# Patient Record
Sex: MALE | Race: WHITE | NOT HISPANIC OR LATINO
[De-identification: names, ages, dates, MRNs, and addresses within clinical notes are randomized per-mention and may not be internally consistent; named-entity substitution may affect disease eponyms.]

---

## 2021-07-20 PROBLEM — Z00.00 ENCOUNTER FOR PREVENTIVE HEALTH EXAMINATION: Status: ACTIVE | Noted: 2021-07-20

## 2021-07-22 ENCOUNTER — APPOINTMENT (OUTPATIENT)
Dept: OTOLARYNGOLOGY | Facility: CLINIC | Age: 36
End: 2021-07-22
Payer: COMMERCIAL

## 2021-07-22 VITALS
HEART RATE: 55 BPM | OXYGEN SATURATION: 99 % | DIASTOLIC BLOOD PRESSURE: 78 MMHG | BODY MASS INDEX: 23.3 KG/M2 | WEIGHT: 145 LBS | SYSTOLIC BLOOD PRESSURE: 136 MMHG | TEMPERATURE: 97.6 F | HEIGHT: 66 IN

## 2021-07-22 DIAGNOSIS — Z78.9 OTHER SPECIFIED HEALTH STATUS: ICD-10-CM

## 2021-07-22 DIAGNOSIS — R42 DIZZINESS AND GIDDINESS: ICD-10-CM

## 2021-07-22 DIAGNOSIS — F17.200 NICOTINE DEPENDENCE, UNSPECIFIED, UNCOMPLICATED: ICD-10-CM

## 2021-07-22 PROCEDURE — 92550 TYMPANOMETRY & REFLEX THRESH: CPT

## 2021-07-22 PROCEDURE — 99203 OFFICE O/P NEW LOW 30 MIN: CPT

## 2021-07-22 PROCEDURE — 99072 ADDL SUPL MATRL&STAF TM PHE: CPT

## 2021-07-22 PROCEDURE — 92557 COMPREHENSIVE HEARING TEST: CPT

## 2021-07-22 PROCEDURE — 92700 UNLISTED ORL SERVICE/PX: CPT

## 2021-07-22 RX ORDER — CLONAZEPAM 2 MG/1
TABLET ORAL
Refills: 0 | Status: ACTIVE | COMMUNITY

## 2021-07-22 RX ORDER — ESOMEPRAZOLE MAGNESIUM 10 MG/1
10 GRANULE, DELAYED RELEASE ORAL
Refills: 0 | Status: ACTIVE | COMMUNITY

## 2021-07-22 RX ORDER — MECLIZINE HYDROCHLORIDE 25 MG/1
25 TABLET, CHEWABLE ORAL
Refills: 0 | Status: ACTIVE | COMMUNITY

## 2021-07-22 NOTE — HISTORY OF PRESENT ILLNESS
[de-identified] : 36M who presents with vertigo x 4 weeks. Patient reports about 4-5 weeks ago he had diarrhea and nausea followed by room spinning dizziness which started about a week later. He reports the dizziness lasts all day with associated nausea. He also notes a difference in hearing when using the phone with decreased hearing on the right compared to the left. He denies any tinnitus, otalgia, otorrhea. 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 \par \par No other ENT complaints. No pertinent FH/SH. he took bonine today which helps him.

## 2021-07-22 NOTE — REVIEW OF SYSTEMS
[Patient Intake Form Reviewed] : Patient intake form was reviewed [As Noted in HPI] : as noted in HPI [Vomiting] : vomiting [Diarrhea] : diarrhea [Negative] : Heme/Lymph [FreeTextEntry1] : all other ROS negative

## 2021-07-22 NOTE — PHYSICAL EXAM
[Midline] : trachea located in midline position [Normal] : orientation to person, place, and time: normal [] : [place]-Hallpike test is negative [de-identified] : gait steady

## 2021-07-22 NOTE — ASSESSMENT
[FreeTextEntry1] : 36M who presents with dizziness x 4 weeks. Fistula test negative.took bonine today so cannot do vng\par \par Plan:\par - audio/tymp\par - VNG\par - VEMP\par - MRI IAC\par rtc with above to try to find the cause of the vertigo. also recommended to see his internist to r/o systemic cause - he said he is undergoing workup. he said he has ct ordered for next week- advised to let ordering physician know we are ordering mri

## 2021-07-30 ENCOUNTER — APPOINTMENT (OUTPATIENT)
Dept: OTOLARYNGOLOGY | Facility: CLINIC | Age: 36
End: 2021-07-30
Payer: COMMERCIAL

## 2021-07-30 VITALS
HEART RATE: 51 BPM | OXYGEN SATURATION: 100 % | TEMPERATURE: 98.5 F | SYSTOLIC BLOOD PRESSURE: 139 MMHG | DIASTOLIC BLOOD PRESSURE: 85 MMHG

## 2021-07-30 DIAGNOSIS — R42 DIZZINESS AND GIDDINESS: ICD-10-CM

## 2021-07-30 PROCEDURE — 92517 VEMP TEST I&R CERVICAL: CPT

## 2021-07-30 PROCEDURE — 92540 BASIC VESTIBULAR EVALUATION: CPT

## 2021-07-30 PROCEDURE — 92537 CALORIC VSTBLR TEST W/REC: CPT

## 2021-07-30 PROCEDURE — 99213 OFFICE O/P EST LOW 20 MIN: CPT

## 2021-07-30 NOTE — DATA REVIEWED
[de-identified] : mri reviewed images with pt and Dr Murdock - mild ds, mild sins opacification, brain normal, no evidence of fx

## 2021-07-30 NOTE — HISTORY OF PRESENT ILLNESS
[de-identified] : followup 37 yo man with 4 weeks of dizziness after landing on his face doing gymnastics. He had mri and is here to review. has more vestibular testing this pm. He had negative fistula test and normal hearing last week. he feels slightly better but the disequilibrium persists.

## 2021-07-30 NOTE — ASSESSMENT
[FreeTextEntry1] : reassured mri ok\par likely vn or concussion\par await vng and vemp\par will discuss with him when they are done

## 2021-08-05 ENCOUNTER — APPOINTMENT (OUTPATIENT)
Dept: OTOLARYNGOLOGY | Facility: CLINIC | Age: 36
End: 2021-08-05
Payer: COMMERCIAL

## 2021-08-05 VITALS
SYSTOLIC BLOOD PRESSURE: 123 MMHG | TEMPERATURE: 98.6 F | DIASTOLIC BLOOD PRESSURE: 72 MMHG | OXYGEN SATURATION: 97 % | HEART RATE: 72 BPM

## 2021-08-05 DIAGNOSIS — R42 DIZZINESS AND GIDDINESS: ICD-10-CM

## 2021-08-05 DIAGNOSIS — R09.81 NASAL CONGESTION: ICD-10-CM

## 2021-08-05 PROCEDURE — 99214 OFFICE O/P EST MOD 30 MIN: CPT

## 2021-08-05 NOTE — ASSESSMENT
[FreeTextEntry1] : concussion/postconcussive vertigo likely etiology with vertical nystagmus\par v rehab\par reassured about ct\par referred to neurology lh (not er) as this is not acute\par followup 2 mo\par nasal congestion is mild; prefers to leave alone

## 2021-08-05 NOTE — DATA REVIEWED
[de-identified] : vng vertical nystagmus reviewed with pt [de-identified] : ct ,mild r max sinus polypoid congestion images and report reviewed with pt

## 2021-08-05 NOTE — HISTORY OF PRESENT ILLNESS
[de-identified] : followup 35 yo man who landed on his face with handsprings and has felt dizzy since. He rerts he is slightly better, but it is still present. Denies hearing changes. Had ct and vng and is here to review results.

## 2021-08-06 ENCOUNTER — APPOINTMENT (OUTPATIENT)
Dept: NEUROLOGY | Facility: CLINIC | Age: 36
End: 2021-08-06
Payer: COMMERCIAL

## 2021-08-06 VITALS
OXYGEN SATURATION: 97 % | TEMPERATURE: 98.1 F | DIASTOLIC BLOOD PRESSURE: 74 MMHG | WEIGHT: 147 LBS | HEIGHT: 66 IN | BODY MASS INDEX: 23.63 KG/M2 | HEART RATE: 76 BPM | SYSTOLIC BLOOD PRESSURE: 117 MMHG

## 2021-08-06 DIAGNOSIS — I77.74 DISSECTION OF VERTEBRAL ARTERY: ICD-10-CM

## 2021-08-06 DIAGNOSIS — F07.81 POSTCONCUSSIONAL SYNDROME: ICD-10-CM

## 2021-08-06 PROCEDURE — 99204 OFFICE O/P NEW MOD 45 MIN: CPT

## 2021-08-06 RX ORDER — ASPIRIN ENTERIC COATED TABLETS 81 MG 81 MG/1
81 TABLET, DELAYED RELEASE ORAL
Qty: 30 | Refills: 1 | Status: ACTIVE | COMMUNITY
Start: 2021-08-06 | End: 1900-01-01

## 2021-09-26 NOTE — ASSESSMENT
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

## 2021-10-12 ENCOUNTER — APPOINTMENT (OUTPATIENT)
Dept: OTOLARYNGOLOGY | Facility: CLINIC | Age: 36
End: 2021-10-12

## 2022-03-28 ENCOUNTER — APPOINTMENT (OUTPATIENT)
Dept: ORTHOPEDIC SURGERY | Facility: CLINIC | Age: 37
End: 2022-03-28
Payer: COMMERCIAL

## 2022-03-28 VITALS — BODY MASS INDEX: 24.91 KG/M2 | WEIGHT: 155 LBS | HEIGHT: 66 IN

## 2022-03-28 DIAGNOSIS — S83.249A OTHER TEAR OF MEDIAL MENISCUS, CURRENT INJURY, UNSPECIFIED KNEE, INITIAL ENCOUNTER: ICD-10-CM

## 2022-03-28 PROCEDURE — 99204 OFFICE O/P NEW MOD 45 MIN: CPT

## 2022-03-28 NOTE — PHYSICAL EXAM
[de-identified] : Left knee\par \par Constitutional: \par The patient is healthy-appearing and in no apparent distress. \par \par Gait:\par The patient ambulates with a normal gait and no limp.\par \par Cardiovascular System: \par The capillary refill is less than 2 seconds. \par \par Skin: \par There are no skin abnormalities.\par \par Left Knee:\par  \par Bony Palpation: \par There is tenderness of the medial joint line. \par There is tenderness of the lateral joint line.\par There is no tenderness of the medial femoral chondyle.\par There is no tenderness of the lateral femoral chondyle.\par There is no tenderness of the tibial tubercle.\par There is no tenderness of the superior patella.\par There is no tenderness of the inferior patella.\par There is no tenderness of the medial patellar facet.\par There is no tenderness of the lateral patellar facet.\par \par Soft Tissue Palpation: \par There is no tenderness of the medial retinaculum.\par There is no tenderness of the lateral retinaculum.\par There is no tenderness of the quadriceps tendon.\par There is no tenderness of the patella tendon.\par There is no tenderness of the ITB.\par There is no tenderness of the pes anserine.\par \par Active Range of Motion: \par The range of motion at the knee actively and passively is 130 with pain at end flexion. \par \par Special Tests: \par There is a POSITIVE Apley.\par There is a POSITIVE McMurrays.\par There is a POSITIVE Steinmanns. \par There is a negative Lachman and Anterior Drawer.\par There is a negative Posterior Drawer.  \par There is no varus or valgus laxity.\par \par Strength: \par There is 5/5 hip flexion and 5/5 knee flexion and extension.  \par \par Psychiatric: \par The patient demonstrates a normal mood and affect and is active and alert [de-identified] : MRI ( [facility] ) as above

## 2022-03-28 NOTE — HISTORY OF PRESENT ILLNESS
[de-identified] : Location: Left knee\par Duration: 3 weeks\par Context: landed weirdly after doing a hand stand\par Quality: sharp\par Aggravating factors: going down stairs\par Associated symptoms: initial swelling, clicking\par Conservative treatment: rest\par Prior studies: MRI [facility] 3/24/22 ( images available and report- there is a medial posterior meniscal tear as well as a posterior tear of the lateral discoid meniscus )

## 2022-03-28 NOTE — ASSESSMENT
[FreeTextEntry1] : Discussed with patient at length symptoms and diagnosis.  Lengthy discussion with patient regarding nonoperative and operative risks and benefits as well as surgical expectations and postop protocols / expectations.  Discussion of possible postoperative bracing and limited weightbearing depending on procedure performed.  Patient expresses understanding and patient's questions were answered.  Patient elects to proceed with surgery.

## 2022-04-12 ENCOUNTER — LABORATORY RESULT (OUTPATIENT)
Age: 37
End: 2022-04-12

## 2022-04-15 ENCOUNTER — TRANSCRIPTION ENCOUNTER (OUTPATIENT)
Age: 37
End: 2022-04-15

## 2022-04-15 ENCOUNTER — APPOINTMENT (OUTPATIENT)
Age: 37
End: 2022-04-15
Payer: COMMERCIAL

## 2022-04-15 PROCEDURE — 29881 ARTHRS KNE SRG MNISECTMY M/L: CPT | Mod: LT,59

## 2022-04-15 PROCEDURE — 29875 ARTHRS KNEE SURG SYNVCT LMTD: CPT | Mod: LT,59

## 2022-04-15 PROCEDURE — 29888 ARTHRS AID ACL RPR/AGMNTJ: CPT | Mod: LT

## 2022-04-15 RX ORDER — OXYCODONE AND ACETAMINOPHEN 5; 325 MG/1; MG/1
5-325 TABLET ORAL
Qty: 30 | Refills: 0 | Status: ACTIVE | COMMUNITY
Start: 2022-04-15 | End: 1900-01-01

## 2022-04-28 ENCOUNTER — APPOINTMENT (OUTPATIENT)
Dept: ORTHOPEDIC SURGERY | Facility: CLINIC | Age: 37
End: 2022-04-28
Payer: COMMERCIAL

## 2022-04-28 PROCEDURE — 99024 POSTOP FOLLOW-UP VISIT: CPT

## 2022-04-28 NOTE — HISTORY OF PRESENT ILLNESS
[de-identified] : s/p LEFT knee arthroscopy with ACL repair, partial lateral menisectomy (discoid), plica excision [de-identified] : Patient is now 2 weeks postop and states overall he is feeling markedly better.  States not requiring any type of pain medication.  Plates only soreness in the flexion although he has not been actively trying to bend his knee. [de-identified] : On exam the left knee, wounds are healed sutures are removed and Steri-Stripped.  There is a minimal effusion.  Range of motion full extension to 80° limited secondary to patient discomfort.  There is a negative Lachman.  Her numbness to the medial or lateral joint line or medial and lateral patellar facets [de-identified] : s/p LEFT knee arthroscopy with ACL repair, partial lateral menisectomy (discoid), plica excision [de-identified] : Discussed at length the patient overall surgery and clinical postop expectations and protocols at this time patient elects PT as well as home exercises.  He is to follow up in 3 weeks

## 2022-05-11 ENCOUNTER — NON-APPOINTMENT (OUTPATIENT)
Age: 37
End: 2022-05-11

## 2022-05-19 ENCOUNTER — APPOINTMENT (OUTPATIENT)
Dept: ORTHOPEDIC SURGERY | Facility: CLINIC | Age: 37
End: 2022-05-19
Payer: COMMERCIAL

## 2022-05-19 DIAGNOSIS — S89.92XA UNSPECIFIED INJURY OF LEFT LOWER LEG, INITIAL ENCOUNTER: ICD-10-CM

## 2022-05-19 DIAGNOSIS — S83.289A OTHER TEAR OF LATERAL MENISCUS, CURRENT INJURY, UNSPECIFIED KNEE, INITIAL ENCOUNTER: ICD-10-CM

## 2022-05-19 PROCEDURE — 99024 POSTOP FOLLOW-UP VISIT: CPT

## 2022-05-19 NOTE — HISTORY OF PRESENT ILLNESS
[de-identified] : s/p LEFT knee arthroscopy with ACL repair, partial lateral menisectomy (discoid), plica excision [de-identified] : Patient is 6 weeks postop and states overall he is feeling markedly better.  States not requiring any type of pain medication.  States markedly improved [de-identified] : On exam the left knee, wounds are healed.  There is no effusion.  Range of motion full extension to 135° limited secondary to patient discomfort.  There is a negative Lachman.  No tenderness to the medial or lateral joint line. [de-identified] : s/p LEFT knee arthroscopy with ACL repair, partial lateral menisectomy (discoid), plica excision [de-identified] : Discussed at length the patient overall surgery and clinical postop expectations and protocols,  Patient may resume activity as tolerated and follow-up as needed.

## 2023-05-22 ENCOUNTER — APPOINTMENT (OUTPATIENT)
Dept: ORTHOPEDIC SURGERY | Facility: CLINIC | Age: 38
End: 2023-05-22
Payer: COMMERCIAL

## 2023-05-22 ENCOUNTER — NON-APPOINTMENT (OUTPATIENT)
Age: 38
End: 2023-05-22

## 2023-05-22 VITALS
WEIGHT: 135 LBS | HEIGHT: 66 IN | HEART RATE: 59 BPM | BODY MASS INDEX: 21.69 KG/M2 | SYSTOLIC BLOOD PRESSURE: 134 MMHG | DIASTOLIC BLOOD PRESSURE: 80 MMHG | OXYGEN SATURATION: 99 %

## 2023-05-22 DIAGNOSIS — G57.31 LESION OF LATERAL POPLITEAL NERVE, RIGHT LOWER LIMB: ICD-10-CM

## 2023-05-22 DIAGNOSIS — M21.371 FOOT DROP, RIGHT FOOT: ICD-10-CM

## 2023-05-22 PROCEDURE — 99214 OFFICE O/P EST MOD 30 MIN: CPT

## 2023-05-23 NOTE — ASSESSMENT
[FreeTextEntry1] : CARLEEN GOODWIN is a 37 year old male with right foot drop and sensory dysfunction.\par I discussed with the patient that their symptoms, signs, and imaging are most consistent with peroneal nerve palsy.\par We reviewed the natural history of this condition and treatment options.\par We agreed on the following plan:\par \par Xray and MRI L spine reviewed today.\par Limited US of bilateral LE as detailed above.\par Start Home Exercises for peroneal muscle conditioning. Demonstration and handout provided. \par Referral for AFO provided.\par Referral to Neurology for EMG/NCS.\par MRI right tib/fib w/o\par Follow up after testing.

## 2023-05-23 NOTE — PHYSICAL EXAM
[de-identified] : General: Well-nourished, well-developed, alert, and in no acute distress.\par Head: Normocephalic.\par Eyes: Pupils equal, extraocular muscles intact, normal sclera.\par Nose: No nasal discharge.\par Cardiovascular: Extremities are warm and well perfused. Distal pulses are symmetric bilaterally.\par Respiratory: No labored breathing.\par Extremities: Sensation is intact distally bilaterally. Distal pulses are symmetric bilaterally\par Lymphatic: No regional lymphadenopathy, no lymphedema\par Neurologic: No focal deficits\par Skin: Normal skin color, texture, and turgor\par Psychiatric: Normal affect \par \par MSK:\par Examination of right lower extremity\par Steppage gait\par Able to toe walk\par Unable to heel walk\par Ambulating independently \par Inspection: \par No erythema, hematoma, ecchymosis or edema \par No calluses/corns/blisters/warts/paronychia or subungual hematoma\par No warmth\par \par Nontender to palpation: medial malleolus, lateral malleolus, base of 5th metatarsal, navicular, ATFL, CFL, PTFL, AITFL, Achilles tendon, PT, FHL, anterior tibialis, peroneals, plantar fascia\par \par ROM: Plantar flexion 45 deg, dorsiflexion -10 deg, inversion 20 deg, eversion 0 deg\par \par Special Tests: \par \par Abbie's click negative \par No pain with static stance on forefeet.\par No palpable piezogenic papules \par No Hallux valgus deformity \par No Valgus hindfoot deformity \par No Wei's deformity \par Tinel's at tarsal tunnel negative\par Lopez test negative\par \par Dorsiflexion 20 deg with knee extension\par Dorsiflexion 20 deg with knee flexion\par \par Examination of left lower extremity\par \par Inspection: No erythema, hematoma, ecchymosis or edema \par No calluses/corns/blisters/warts/paronychia or subungual hematoma\par No warmth\par Alignment: neutral alignment\par \par Nontender to palpation: medial malleolus, lateral malleolus, base of 5th metatarsal, navicular, ATFL, CFL, PTFL, AITFL, Achilles tendon, PT, FHL, tibialis anterior, peroneals, plantar fascia\par \par ROM: Plantar flexion 45 deg, dorsiflexion 20 deg, inversion 20 deg, eversion 30 deg\par \par \par Sensation impaired to light touch over the superficial and deep peroneal nerve distributions on right, intact on left. Capillary refill is less than two seconds. Posterior tibial and dorsalis pedis pulses 2+ equal bilaterally. No calf swelling or tenderness bilaterally. Strength testing shows  Hip flexion 5/5, Hip abduction 5/5, Hip abduction 5/5, Knee Extension 5/5, Knee Flexion 5/5, dorsiflexion 3/5 on right,  5/5 on left, plantar flexion 5/5, EHL 3/5 on right, 5/5 on left.\par Reflexes: Patellar 2+, Achilles 2+, Babinski negative  [de-identified] : Limited US right LE (5/22/23):  Patient placed in supine position. The fibular head, peroneus longus, brevis, tibialis anterior and common peroneal nerve were visualized in SAX and LAX with Sonosite 15 Hz linear transducer. Normal appearance of all structures.\par

## 2023-05-23 NOTE — REVIEW OF SYSTEMS
[Headache] : no headache [Dizziness] : no dizziness [Convulsions] : no convulsions [Negative] : Heme/Lymph [de-identified] : Weakness, numbness and paraesthesia right LE

## 2023-05-23 NOTE — REASON FOR VISIT
[Initial Visit] : an initial visit for [FreeTextEntry2] : right dorsal foot numbness and paraesthesia

## 2023-05-23 NOTE — HISTORY OF PRESENT ILLNESS
[de-identified] : CARLEEN GOODWIN is a 37 year old male who presents with right foot drop \par States the onset of symptoms was 10 days ago\par No injury or trauma. No prolonged compression over fibular head.\par States that he does a lot of gymnastics. \par [facility]ed to [facility] had XR and MRI L spine negative per reports (images not available).\par CBC, CMP normal\par CRP, ESR not performed.\par PCP Dr. Raj Granados \par No recent infection. Had a cold 3 months ago.\par Hx DVT RLE 7 years ago\par Pain is nonradiating.\par There is associated \par There is no associated swelling, numbness, paraesthesia or weakness.\par There is no bowel or bladder dysfunction, saddle anaesthesia, fever, unintentional weight loss.\par Has tried orthotics/insoles\par Patient ambulates independently.\par Exercises regularly. Gymnastics \par Has not tried PT. 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